# Patient Record
Sex: MALE | Race: OTHER | HISPANIC OR LATINO
[De-identification: names, ages, dates, MRNs, and addresses within clinical notes are randomized per-mention and may not be internally consistent; named-entity substitution may affect disease eponyms.]

---

## 2021-08-15 ENCOUNTER — TRANSCRIPTION ENCOUNTER (OUTPATIENT)
Age: 26
End: 2021-08-15

## 2021-08-16 ENCOUNTER — EMERGENCY (EMERGENCY)
Facility: HOSPITAL | Age: 26
LOS: 1 days | Discharge: HOME | End: 2021-08-16
Attending: EMERGENCY MEDICINE | Admitting: EMERGENCY MEDICINE
Payer: OTHER MISCELLANEOUS

## 2021-08-16 VITALS
DIASTOLIC BLOOD PRESSURE: 73 MMHG | TEMPERATURE: 98 F | SYSTOLIC BLOOD PRESSURE: 131 MMHG | OXYGEN SATURATION: 100 % | HEART RATE: 70 BPM | RESPIRATION RATE: 16 BRPM

## 2021-08-16 VITALS
SYSTOLIC BLOOD PRESSURE: 150 MMHG | OXYGEN SATURATION: 96 % | DIASTOLIC BLOOD PRESSURE: 80 MMHG | RESPIRATION RATE: 18 BRPM | HEART RATE: 67 BPM | TEMPERATURE: 98 F | WEIGHT: 210.1 LBS
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
## 2021-08-16 DIAGNOSIS — S61.211A LACERATION WITHOUT FOREIGN BODY OF LEFT INDEX FINGER WITHOUT DAMAGE TO NAIL, INITIAL ENCOUNTER: ICD-10-CM
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
## 2021-08-16 PROCEDURE — 73130 X-RAY EXAM OF HAND: CPT | Mod: 26,LT

## 2021-08-16 PROCEDURE — 99284 EMERGENCY DEPT VISIT MOD MDM: CPT

## 2021-08-16 RX ORDER — CEPHALEXIN 500 MG
1 CAPSULE ORAL
Qty: 30 | Refills: 0
Start: 2021-08-16 | End: 2021-08-25

## 2021-08-16 RX ORDER — BNT162B2 0.23 MG/2.25ML
0.3 INJECTION, SUSPENSION INTRAMUSCULAR ONCE
Refills: 0 | Status: COMPLETED | OUTPATIENT
Start: 2021-08-16 | End: 2021-08-16

## 2021-08-16 RX ORDER — CEFAZOLIN SODIUM 1 G
2000 VIAL (EA) INJECTION ONCE
Refills: 0 | Status: COMPLETED | OUTPATIENT
Start: 2021-08-16 | End: 2021-08-16

## 2021-08-16 RX ORDER — ACETAMINOPHEN 500 MG
650 TABLET ORAL ONCE
Refills: 0 | Status: COMPLETED | OUTPATIENT
Start: 2021-08-16 | End: 2021-08-16

## 2021-08-16 RX ADMIN — BNT162B2 0.3 MILLILITER(S): 0.23 INJECTION, SUSPENSION INTRAMUSCULAR at 14:10

## 2021-08-16 RX ADMIN — Medication 650 MILLIGRAM(S): at 14:10

## 2021-08-16 RX ADMIN — Medication 100 MILLIGRAM(S): at 14:10

## 2021-08-16 NOTE — ED PROVIDER NOTE - OBJECTIVE STATEMENT
25 y/o M, no PMH, he works as a , right handed, presents with a laceration after cutting his finger with a saw just prior to arrival at work. He says there was bleeding that was controlled with pressure to the left 2nd digit. He says he is able to fully move finger but fells numbness all around the site. Denies pain. He says his last Tdap was 4-5 months ago. Denies any medications. Of not pt is not vaccinated but would like a COVID vaccination today in the ED. 27 y/o M, no PMH, he works as a , right handed, presents with a laceration after cutting his finger with a saw just prior to arrival at work. He says there was bleeding that was controlled with pressure to the left 2nd digit. He says he is able to fully move finger but feels numbness all around the site. Denies pain. He says his last Tdap was 4-5 months ago. Denies any medications. Of note, pt is not vaccinated but would like a COVID vaccination today in the ED.

## 2021-08-16 NOTE — ED PROVIDER NOTE - PATIENT PORTAL LINK FT
You can access the FollowMyHealth Patient Portal offered by [facility] by registering at the following website: [email or web address]. By joining [employer]’s FollowMyHealth portal, you will also be able to view your health information using other applications (apps) compatible with our system.

## 2021-08-16 NOTE — ED ADULT NURSE NOTE - NSFALLRSKOUTCOME_ED_ALL_ED
Postsurgical Seroma    A seroma is a sterile collection of fluid under the skin, usually at the site of a surgical incision. Fluid builds up under the skin where tissue was removed. It may form soon after your surgery. Or it may form up to about 1 to 2 weeks after surgery. It may look like a swollen lump and feel tender or sore.  A small seroma is not dangerous. Depending on its size and symptoms, it may not need to be treated. The seroma may go away on its own within a few weeks or months. Your body slowly absorbs the fluid. No medicine will make it go away faster. But if you have a large seroma or it is causing pain, your health care provider may drain it. This is done with a syringe and needle. Seromas can return and may need to be drained multiple times. Rarely, a minor procedure may need to be done to remove the seroma. Long-term problems from a seroma are rare. Most go away on their own.  Home care  You may be given medicines to relieve pain. These may include acetaminophen and ibuprofen. Take these as directed. Check the seroma daily for the signs of infection listed below.  Follow-up care  Follow up with your health care provider, or as advised.  When to seek medical advice  Call your health care provider right away if you have signs of infection:  · Fever of 1010.4°F (38°C) or higher, or as directed by your health care provider  · Seroma or skin around it feels warm  · Pain in the seroma that gets worse  · Redness or swelling that gets worse  Also call your provider right away if any of these occur:  · Drainage from the seroma that is white or colored or very bloody. Clear or slightly bloody drainage is normal.  · Wound opens up  · Rapid heart rate  · Shortness of breath  © 2257-5928 [employer]. [address] [place] [address], [place] [address]. All rights reserved. This information is not intended as a substitute for professional medical care. Always follow your healthcare professional's  instructions.         Universal Safety Interventions

## 2021-08-16 NOTE — ED PROVIDER NOTE - CARE PROVIDER_API CALL
Helen Wren)  Plastic Surgery; Surgery  [address] [place] [id number]  Phone: [phone]  Fax: [phone]  Follow Up Time:

## 2021-08-16 NOTE — ED PROVIDER NOTE - NSFOLLOWUPINSTRUCTIONS_ED_ALL_ED_FT
[employer]® CareNotes®     :  [facility]  	                       ACUTE WOUNDS - AfterCare(R) Instructions(ER/ED)           Acute Wounds    WHAT YOU NEED TO KNOW:    An acute wound is an injury that causes a break in the skin. As your wound begins to heal, it is normal to have some swelling, pain, and redness. Your body's immune system is working to keep your wound from getting infected. Your wound may develop a scab. The scab protects your wound as it heals.     DISCHARGE INSTRUCTIONS:    Call your local emergency number (911 in the [place]) if:   •You suddenly have trouble breathing or have chest pain.          Return to the emergency department if:   •Blood soaks through your bandage.      •You have pus or a foul odor coming from the wound.      •Your stitches come apart or your wound reopens.      Call your doctor if:   •You continue to have pain even after you have taken pain medicine.      •You have muscle, joint, or body aches, sweating, or a fever.      •You have increased swelling, redness, or bleeding in your wound.      •Your skin is itchy, swollen, or you have a rash.      •You have questions or concerns about your condition or care.      Medicines: You may need any of the following:   •Antibiotics may be given to prevent or treat an infection.       •Td vaccine is a booster shot used to help prevent tetanus and diphtheria. The Td booster may be given to adolescents and adults every 10 years or for certain wounds and injuries.      •Prescription pain medicine may be given. Ask your healthcare provider how to take this medicine safely. Some prescription pain medicines contain acetaminophen. Do not take other medicines that contain acetaminophen without talking to your healthcare provider. Too much acetaminophen may cause liver damage. Prescription pain medicine may cause constipation. Ask your healthcare provider how to prevent or treat constipation.       •Acetaminophen decreases pain and fever. It is available without a doctor's order. Ask how much to take and how often to take it. Follow directions. Read the labels of all other medicines you are using to see if they also contain acetaminophen, or ask your doctor or pharmacist. Acetaminophen can cause liver damage if not taken correctly. Do not use more than 4 grams (4,000 milligrams) total of acetaminophen in one day.       •NSAIDs, such as ibuprofen, help decrease swelling, pain, and fever. This medicine is available with or without a doctor's order. NSAIDs can cause stomach bleeding or kidney problems in certain people. If you take blood thinner medicine, always ask if NSAIDs are safe for you. Always read the medicine label and follow directions. Do not give these medicines to children under 6 months of age without direction from your child's healthcare provider.      •Take your medicine as directed. Contact your healthcare provider if you think your medicine is not helping or if you have side effects. Tell him or her if you are allergic to any medicine. Keep a list of the medicines, vitamins, and herbs you take. Include the amounts, and when and why you take them. Bring the list or the pill bottles to follow-up visits. Carry your medicine list with you in case of an emergency.      Care for your wound as directed: Follow your healthcare provider's instructions on caring for your type of wound. The following care items are for most wounds:  •Keep your wound covered with a clean and dry bandage. Change your bandage if it becomes wet or dirty. This will decrease the risk for infection in your wound. Follow your healthcare provider's instructions for changing your dressing.       •Do not soak in a tub or swim until your healthcare provider says it is okay. Your wound may open if you get it too wet. Dirt from the water can also get into your wound and cause an infection.      •Keep pets away from your wound. Pets carry germs that can cause a wound infection.       •Do not pick or scratch scabs. Let scabs fall off on their own. You may damage new skin that is forming under the scab. You may have a worse scar after the damage.      •Eat healthy foods and drink liquids as directed. Healthy foods give your body the nutrients it needs to heal your wound. Liquids prevent dehydration that can decrease the blood supply to your wound. Healthy foods include fruits, vegetables, grains (breads and cereals), dairy, and protein foods. Protein foods include meat, fish, nuts, and soy products. Protein, calories, vitamin C, and zinc help wounds heal. Ask your healthcare provider for more information about the foods you should eat to improve healing.  Healthy Foods           Follow up with your doctor as directed: Write down your questions so you remember to ask them during your visits.       © Copyright [employer] 2021           back to top                          © Copyright [employer] 2021

## 2021-08-16 NOTE — ED ADULT TRIAGE NOTE - CHIEF COMPLAINT QUOTE
Pt BIBA for laceration to the left hand 2nd digit from electric saw, last tdap unknown, bleeding controlled.

## 2021-08-16 NOTE — ED PROVIDER NOTE - CLINICAL SUMMARY MEDICAL DECISION MAKING FREE TEXT BOX
25 y/o M with left second digit laceration possible tendon involvement. X-ray shows no foreign body, no fractures, no dislocation. Area appears clean but subjective numbness. Ancef to be given. Consulted Dr. Wren. Of note will order COVID vaccination per patients request.

## 2021-08-16 NOTE — ED ADULT TRIAGE NOTE - ESI TRIAGE ACUITY LEVEL, MLM
CERTIFICATE OF RETURN TO SCHOOL        This is to certify that Tone Soria Jr. has been under my care on 1/22/2018. He can return to school on 1/23/2018.      SIGNATURE:___________________________________________,   1/22/2018                                                        Dr. Kermit Hall MD         [place] [id number] [phone]       4

## 2021-08-16 NOTE — ED PROVIDER NOTE - SKIN, MLM
Hand: Less than 2 second capillary refill. Subjective numbness over the dorsal aspect of the left 2nd digit. Overlying and in the surrounding area of a diagonal linear laceration that is inferior to his MCP joint. He is able to fully range his finger, but appears there is a visualized tendon laceration on his exam. No TTP. Minimal active bleeding. Hand: Less than 2 second capillary refill. Subjective numbness over the dorsal aspect of the left 2nd digit overlying and in the surrounding area of a diagonal linear laceration that is inferior to his MCP joint. He is able to fully range his finger, but appears there is a visualized tendon laceration on his exam. No TTP. Minimal active bleeding.

## 2021-09-07 ENCOUNTER — APPOINTMENT (OUTPATIENT)
Age: 26
End: 2021-09-07

## 2023-06-12 NOTE — ED ADULT TRIAGE NOTE - AS HEIGHT TYPE
Pt sent to ed w/ c/o sob, generalized weakness/fatigue, pt sts sent to ed due to hgb of 4.5    stated

## 2023-11-08 PROBLEM — Z78.9 OTHER SPECIFIED HEALTH STATUS: Chronic | Status: ACTIVE | Noted: 2021-08-16
